# Patient Record
Sex: FEMALE | Race: WHITE | Employment: FULL TIME
[De-identification: names, ages, dates, MRNs, and addresses within clinical notes are randomized per-mention and may not be internally consistent; named-entity substitution may affect disease eponyms.]

---

## 2017-06-24 ENCOUNTER — HEALTH MAINTENANCE LETTER (OUTPATIENT)
Age: 62
End: 2017-06-24

## 2017-09-22 ENCOUNTER — OFFICE VISIT (OUTPATIENT)
Dept: GASTROENTEROLOGY | Facility: CLINIC | Age: 62
End: 2017-09-22
Attending: INTERNAL MEDICINE
Payer: COMMERCIAL

## 2017-09-22 ENCOUNTER — TRANSFERRED RECORDS (OUTPATIENT)
Dept: HEALTH INFORMATION MANAGEMENT | Facility: CLINIC | Age: 62
End: 2017-09-22

## 2017-09-22 VITALS
HEART RATE: 78 BPM | WEIGHT: 120.7 LBS | TEMPERATURE: 98.7 F | HEIGHT: 61 IN | DIASTOLIC BLOOD PRESSURE: 69 MMHG | SYSTOLIC BLOOD PRESSURE: 129 MMHG | BODY MASS INDEX: 22.79 KG/M2

## 2017-09-22 DIAGNOSIS — B18.2 CHRONIC HEPATITIS C WITHOUT HEPATIC COMA (H): Primary | ICD-10-CM

## 2017-09-22 PROCEDURE — 99213 OFFICE O/P EST LOW 20 MIN: CPT | Mod: ZF

## 2017-09-22 PROCEDURE — 91200 LIVER ELASTOGRAPHY: CPT | Mod: ZF

## 2017-09-22 ASSESSMENT — PAIN SCALES - GENERAL: PAINLEVEL: NO PAIN (0)

## 2017-09-22 NOTE — LETTER
9/22/2017       RE: Geni Bergman  [address]  [place] [place] [address]     Dear Colleague,    Thank you for referring your patient, Geni Bergman, to the [facility] HEPATOLOGY at [facility]. Please see a copy of my visit note below.    [facility] [place]    Hepatology follow-up    Follow-up visit for HCV    Subjective:  Ms. Bergman is a 62-year-old  female who was previously seen here and followed by my colleague, Lorenza Clay, for hepatitis C.  Her workup was significant in that she had genotype 1.  She also has stage II fibrosis on liver biopsy in 2012.  She had a low viral load and for that reason, she was approved and has done 8 weeks of ledipasvir/sofosbuvir (Harvoni).  She had a drop in her HCV RNA but did not achieve end of treatment response and for that reason, she is coming back today to be considered for salvage therapy.     In talking with her, she is telling me that she was compliant with her medication.  She never missed a single tablet and she is still feeling good about herself.  Does not have any signs of chronic liver disease, including edema, abdominal distention.  She does not have other GI symptoms.  Denies any nausea, vomiting or abdominal pain.  She is up-to-date with colonoscopy also.  Her weight has remained stable and she is not on any medication which would interact if we prescribed her any of the DAAs.  Patient denies jaundice, lethargy or confusion. Denies melena, hematemesis or hematochezia. No  fevers, sweats or chills.         Medical hx Surgical hx   History reviewed. No pertinent past medical history.   History reviewed. No pertinent surgical history.       Medications  Prior to Admission medications    Medication Sig Start Date End Date Taking? Authorizing Provider   TESTOSTERONE IN VANICREAM 1%, 10MG/GM,CREAM 0.25 mLs. Three times weekly   Yes Reported, Patient   VERSABASE CREA 80/20 apply 0.5mL daily   Yes  "Reported, Patient   PROGESTERONE 100mg place under tongue once daily   Yes Reported, Patient   Cholecalciferol (VITAMIN D3) LIQD 5,000 capsules One drop daily   Yes Reported, Patient   dhea 25 MG TABS Take 100 tablets by mouth daily    Yes Reported, Patient   Selenium 200 MCG TABS Take 1 tablet by mouth daily.   Yes Reported, Patient   Magnesium 300 MG CAPS Take 1 capsule by mouth daily.   Yes Reported, Patient       Allergies  No Known Allergies    Review of systems  A 10-point review of systems was negative    Examination  /69  Pulse 78  Temp 98.7  F (37.1  C) (Oral)  Ht 1.549 m (5' 1\")  Wt 54.7 kg (120 lb 11.2 oz)  BMI 22.81 kg/m2  Body mass index is 22.81 kg/(m^2).    Gen- well, NAD, A+Ox3, normal color  Lym- no palpable LAD  CVS- RRR  RS- CTA  Abd- Not distended, no hepatosplenomegaly.  Extr- hands normal, no FLIP  Skin- no rash or jaundice  Neuro- no asterixis  Psych- normal mood    Laboratory  Lab Results   Component Value Date     09/01/2016    POTASSIUM 4.4 09/01/2016    CHLORIDE 106 09/01/2016    CO2 27 09/01/2016    BUN 16 09/01/2016    CR 0.86 09/01/2016       Lab Results   Component Value Date    BILITOTAL 0.8 09/01/2016    ALT 45 09/01/2016    AST 42 09/01/2016    ALKPHOS 104 09/01/2016       Lab Results   Component Value Date    ALBUMIN 3.7 09/01/2016    PROTTOTAL 6.7 09/01/2016        Lab Results   Component Value Date    WBC 5.7 09/01/2016    HGB 14.6 09/01/2016    MCV 93 09/01/2016     09/01/2016       Lab Results   Component Value Date    INR 0.93 09/01/2016       Radiology    Assessment and Plan:   Ms. Bergman is a 62-year-old  female who was diagnosed with hepatitis C genotype 1.  She did do ledipasvir/sofosbuvir (Harvoni) for 8 weeks.  She did fail that and did not achieve end of treatment response.  Please note that the patient was not cirrhotic.  She had stage II fibrosis in 2012.  She has no signs of chronic liver disease.        PLAN:  Get the fibrosis scan " and she will have her labs repeated today.  We will consider her for salvage therapy.  She is very comfortable with that and she has no contraindications to this treatment.  Once the fibrosis scan is back, we will see if there was any progression of her liver disease, which was previously stage II.  She will also have an abdominal ultrasound.      This was a 25-minute visit of which more than 50% was spent in explaining to the patient what our plan of care was.  We answered all of her questions.      cc:  Primary care physician         Lorenza Clay M.D.     Gina Pacheco MD  Hepatology  [facility]

## 2017-09-22 NOTE — PROGRESS NOTES
[facility]    Hepatology follow-up    Follow-up visit for HCV    Subjective:  Ms. Bergman is a 62-year-old  female who was previously seen here and followed by my colleague, Lorenza Clay, for hepatitis C.  Her workup was significant in that she had genotype 1.  She also has stage II fibrosis on liver biopsy in 2012.  She had a low viral load and for that reason, she was approved and has done 8 weeks of ledipasvir/sofosbuvir (Harvoni).  She had a drop in her HCV RNA but did not achieve end of treatment response and for that reason, she is coming back today to be considered for salvage therapy.     In talking with her, she is telling me that she was compliant with her medication.  She never missed a single tablet and she is still feeling good about herself.  Does not have any signs of chronic liver disease, including edema, abdominal distention.  She does not have other GI symptoms.  Denies any nausea, vomiting or abdominal pain.  She is up-to-date with colonoscopy also.  Her weight has remained stable and she is not on any medication which would interact if we prescribed her any of the DAAs.  Patient denies jaundice, lethargy or confusion. Denies melena, hematemesis or hematochezia. No  fevers, sweats or chills.         Medical hx Surgical hx   History reviewed. No pertinent past medical history.   History reviewed. No pertinent surgical history.       Medications  Prior to Admission medications    Medication Sig Start Date End Date Taking? Authorizing Provider   TESTOSTERONE IN VANICREAM 1%, 10MG/GM,CREAM 0.25 mLs. Three times weekly   Yes Reported, Patient   VERSABASE CREA 80/20 apply 0.5mL daily   Yes Reported, Patient   PROGESTERONE 100mg place under tongue once daily   Yes Reported, Patient   Cholecalciferol (VITAMIN D3) LIQD 5,000 capsules One drop daily   Yes Reported, Patient   dhea 25 MG TABS Take 100 tablets by mouth daily    Yes Reported, Patient   Selenium 200 MCG  "TABS Take 1 tablet by mouth daily.   Yes Reported, Patient   Magnesium 300 MG CAPS Take 1 capsule by mouth daily.   Yes Reported, Patient       Allergies  No Known Allergies    Review of systems  A 10-point review of systems was negative    Examination  /69  Pulse 78  Temp 98.7  F (37.1  C) (Oral)  Ht 1.549 m (5' 1\")  Wt 54.7 kg (120 lb 11.2 oz)  BMI 22.81 kg/m2  Body mass index is 22.81 kg/(m^2).    Gen- well, NAD, A+Ox3, normal color  Lym- no palpable LAD  CVS- RRR  RS- CTA  Abd- Not distended, no hepatosplenomegaly.  Extr- hands normal, no FLIP  Skin- no rash or jaundice  Neuro- no asterixis  Psych- normal mood    Laboratory  Lab Results   Component Value Date     09/01/2016    POTASSIUM 4.4 09/01/2016    CHLORIDE 106 09/01/2016    CO2 27 09/01/2016    BUN 16 09/01/2016    CR 0.86 09/01/2016       Lab Results   Component Value Date    BILITOTAL 0.8 09/01/2016    ALT 45 09/01/2016    AST 42 09/01/2016    ALKPHOS 104 09/01/2016       Lab Results   Component Value Date    ALBUMIN 3.7 09/01/2016    PROTTOTAL 6.7 09/01/2016        Lab Results   Component Value Date    WBC 5.7 09/01/2016    HGB 14.6 09/01/2016    MCV 93 09/01/2016     09/01/2016       Lab Results   Component Value Date    INR 0.93 09/01/2016       Radiology    Assessment and Plan:   Ms. Bergman is a 62-year-old  female who was diagnosed with hepatitis C genotype 1.  She did do ledipasvir/sofosbuvir (Harvoni) for 8 weeks.  She did fail that and did not achieve end of treatment response.  Please note that the patient was not cirrhotic.  She had stage II fibrosis in 2012.  She has no signs of chronic liver disease.        PLAN:  Get the fibrosis scan and she will have her labs repeated today.  We will consider her for salvage therapy.  She is very comfortable with that and she has no contraindications to this treatment.  Once the fibrosis scan is back, we will see if there was any progression of her liver disease, which was " previously stage II.  She will also have an abdominal ultrasound.      This was a 25-minute visit of which more than 50% was spent in explaining to the patient what our plan of care was.  We answered all of her questions.      cc:  Primary care physician         Lorenza Clay M.D.     Gina Pacheco MD  Hepatology  [facility]

## 2017-09-22 NOTE — NURSING NOTE
"Chief Complaint   Patient presents with     RECHECK     follow up with Hep C, tzimmer cma       Initial /69  Pulse 78  Temp 98.7  F (37.1  C) (Oral)  Ht 1.549 m (5' 1\")  Wt 54.7 kg (120 lb 11.2 oz)  BMI 22.81 kg/m2 Estimated body mass index is 22.81 kg/(m^2) as calculated from the following:    Height as of this encounter: 1.549 m (5' 1\").    Weight as of this encounter: 54.7 kg (120 lb 11.2 oz).  Medication Reconciliation: complete    "

## 2017-09-22 NOTE — MR AVS SNAPSHOT
After Visit Summary   9/22/2017    Geni Bergman    MRN: [id number]           Patient Information     Date Of Birth          1955        Visit Information        Provider Department      9/22/2017 9:30 AM Gina Pacheco MD [facility] Hepatology        Today's Diagnoses     Chronic hepatitis C without hepatic coma (H)    -  1       Follow-ups after your visit        Follow-up notes from your care team     Return in about 4 months (around 1/22/2018).      Your next 10 appointments already scheduled     Sep 26, 2017  9:00 AM CDT   US ABDOMEN COMPLETE with US2   [facility] US ([facility])    [address] [place] [id number] [phone]           Please bring a list of your medicines (including vitamins, minerals and over-the-counter drugs). Also, tell your doctor about any allergies you may have. Wear comfortable clothes and leave your valuables at home.  Adults: No eating or drinking for 8 hours before the exam. You may take medicine with a small sip of water.  Children: - Children 6+ years: No food or drink for 6 hours before exam. - Children 1-5 years: No food or drink for 4 hours before exam. - Infants, breast-fed: may have breast milk up to 2 hours before exam. - Infants, formula: may have bottle until 4 hours before exam.  Please call the Imaging Department at your exam site with any questions.            Oct 13, 2017  8:00 AM CDT   Lab with  LAB   [facility] Lab ([facility])    [address] [place] [id number] [phone]            Oct 13, 2017  8:50 AM CDT   (Arrive by 8:35 AM)   Return General Liver with Lorenza Clay MD   [facility] Hepatology ([facility])    [address] [place] [id number] [phone]              Who to contact     If you have questions or need follow up information  "about today's clinic visit or your schedule please contact [facility] HEPATOLOGY directly at [phone].  Normal or non-critical lab and imaging results will be communicated to you by [employer], letter or phone within 4 business days after the clinic has received the results. If you do not hear from us within 7 days, please contact the clinic through [employer] or phone. If you have a critical or abnormal lab result, we will notify you by phone as soon as possible.  Submit refill requests through [employer] or call your pharmacy and they will forward the refill request to us. Please allow 3 business days for your refill to be completed.          Additional Information About Your Visit        [employer] Information     [employer] gives you secure access to your electronic health record. If you see a primary care provider, you can also send messages to your care team and make appointments. If you have questions, please call your primary care clinic.  If you do not have a primary care provider, please call [phone] and they will assist you.        Care EveryWhere ID     This is your Care EveryWhere ID. This could be used by other organizations to access your [place] medical records  [id number]        Your Vitals Were     Pulse Temperature Height BMI (Body Mass Index)          78 98.7  F (37.1  C) (Oral) 1.549 m (5' 1\") 22.81 kg/m2         Blood Pressure from Last 3 Encounters:   09/22/17 129/69   09/02/16 105/56   09/11/15 101/69    Weight from Last 3 Encounters:   09/22/17 54.7 kg (120 lb 11.2 oz)   09/02/16 52.6 kg (116 lb)   09/11/15 53.3 kg (117 lb 8 oz)              Today, you had the following     No orders found for display         Today's Medication Changes          These changes are accurate as of: 9/22/17 11:59 PM.  If you have any questions, ask your nurse or doctor.               Stop taking these medicines if you haven't already. Please contact your care team if you have questions.     Carnitine-B5-B6 500-15-5 MG " Tabs   Stopped by:  Gina Pacheco MD           FP ELIXIR OR   Stopped by:  Gina Pacheco MD           ledipasvir-sofosbuvir  MG per tablet   Commonly known as:  HARVONI   Stopped by:  Gina Pacheco MD                    Primary Care Provider Office Phone # Fax #    Yao Lema [phone]       INNOVATIVE DIRECTIONS IN HEALTH [address] [facility] [address] [facility] [address]    [facility] [address]        Equal Access to Services     [facility]: Hadii aad ku hadasho Soomaali, waaxda luqadaha, qaybta kaalmada adeegyada, waxay idiin hayaan adenoman villarreal . So [facility] [phone].    ATENCIÓN: Si habla español, tiene a blackwood disposición servicios gratuitos de asistencia lingüística. [facility] [phone].    We comply with applicable federal civil rights laws and [place] laws. We do not discriminate on the basis of race, color, national origin, age, disability sex, sexual orientation or gender identity.            Thank you!     Thank you for choosing [facility] HEPATOLOGY  for your care. Our goal is always to provide you with excellent care. Hearing back from our patients is one way we can continue to improve our services. Please take a few minutes to complete the written survey that you may receive in the mail after your visit with us. Thank you!             Your Updated Medication List - Protect others around you: Learn how to safely use, store and throw away your medicines at www.disposemymeds.org.          This list is accurate as of: 9/22/17 11:59 PM.  Always use your most recent med list.                   Brand Name Dispense Instructions for use Diagnosis    dhea 25 MG Tabs      Take 100 tablets by mouth daily        Magnesium 300 MG Caps      Take 1 capsule by mouth daily.        PROGESTERONE      100mg place under tongue once daily        Selenium 200 MCG Tabs      Take 1 tablet by mouth daily.        TESTOSTERONE IN VANICREAM 1% (10MG /GM) TD CREAM      0.25  mLs. Three times weekly        VERSABASE Crea      80/20 apply 0.5mL daily        Vitamin D3 Liqd      5,000 capsules One drop daily

## 2017-09-25 DIAGNOSIS — B18.2 CHRONIC HEPATITIS C WITHOUT HEPATIC COMA (H): Primary | ICD-10-CM

## 2017-09-29 ENCOUNTER — TELEPHONE (OUTPATIENT)
Dept: GASTROENTEROLOGY | Facility: CLINIC | Age: 62
End: 2017-09-29

## 2017-09-29 DIAGNOSIS — B18.2 CHRONIC HEPATITIS C WITHOUT HEPATIC COMA (H): Primary | ICD-10-CM

## 2017-09-29 NOTE — TELEPHONE ENCOUNTER
Writer spoke with patient regarding Dr. Pacheco's recommendations for MRI abd early next week. Liver mass seen on recent US as well as indications for cirrhosis on recent US despite low fibrosis scoring on fibrosis scan.    Pt aware of the plan and will call to schedule herself. Number given for the imaging department. Will await results.    Geni Bergman - 09/26/17 << Less Detail     Gina Pacheco MD     Sent: Thu September 28, 2017  9:40 PM     To: Andrew Patiño LPN          Result Note      Your ultrasound shows mild coarse heterogeneous liver parenchyma with slight nodular contours and irregular surface concerning for hepatic intrinsic disease such as advanced fibrosis.. There is a complex cystic lesion with a borderline thickened septation in the     left lobe without vascularization. Differentials include biliary cystadenoma, therefore close attention on follow-up studies is recommended.  Additionally, given history of hepatitic C, recommend further evaluation with dedicated liver MRI. It also shows gall stones.

## 2017-10-12 ENCOUNTER — RADIANT APPOINTMENT (OUTPATIENT)
Dept: MRI IMAGING | Facility: CLINIC | Age: 62
End: 2017-10-12
Attending: INTERNAL MEDICINE
Payer: COMMERCIAL

## 2017-10-12 DIAGNOSIS — B18.2 CHRONIC HEPATITIS C WITHOUT HEPATIC COMA (H): ICD-10-CM

## 2017-10-12 PROCEDURE — A9585 GADOBUTROL INJECTION: HCPCS | Mod: JW | Performed by: INTERNAL MEDICINE

## 2017-10-12 PROCEDURE — 74183 MRI ABD W/O CNTR FLWD CNTR: CPT | Performed by: RADIOLOGY

## 2017-10-12 RX ORDER — GADOBUTROL 604.72 MG/ML
7.5 INJECTION INTRAVENOUS ONCE
Status: DISCONTINUED | OUTPATIENT
Start: 2017-10-12 | End: 2017-10-12

## 2017-10-12 RX ORDER — GADOBUTROL 604.72 MG/ML
7.5 INJECTION INTRAVENOUS ONCE
Status: COMPLETED | OUTPATIENT
Start: 2017-10-12 | End: 2017-10-12

## 2017-10-12 RX ADMIN — GADOBUTROL 5.5 ML: 604.72 INJECTION INTRAVENOUS at 08:48

## 2017-10-13 DIAGNOSIS — B18.2 CHRONIC HEPATITIS C WITHOUT HEPATIC COMA (H): Primary | ICD-10-CM

## 2017-10-17 ENCOUNTER — TRANSFERRED RECORDS (OUTPATIENT)
Dept: HEALTH INFORMATION MANAGEMENT | Facility: CLINIC | Age: 62
End: 2017-10-17

## 2017-10-17 DIAGNOSIS — B18.2 CHRONIC HEPATITIS C WITHOUT HEPATIC COMA (H): Primary | ICD-10-CM

## 2017-12-13 ENCOUNTER — TRANSFERRED RECORDS (OUTPATIENT)
Dept: HEALTH INFORMATION MANAGEMENT | Facility: CLINIC | Age: 62
End: 2017-12-13

## 2018-01-23 ENCOUNTER — TELEPHONE (OUTPATIENT)
Dept: GASTROENTEROLOGY | Facility: CLINIC | Age: 63
End: 2018-01-23

## 2018-01-26 ENCOUNTER — OFFICE VISIT (OUTPATIENT)
Dept: GASTROENTEROLOGY | Facility: CLINIC | Age: 63
End: 2018-01-26
Attending: INTERNAL MEDICINE
Payer: COMMERCIAL

## 2018-01-26 VITALS
DIASTOLIC BLOOD PRESSURE: 77 MMHG | BODY MASS INDEX: 22.43 KG/M2 | SYSTOLIC BLOOD PRESSURE: 123 MMHG | WEIGHT: 118.8 LBS | TEMPERATURE: 98 F | HEART RATE: 83 BPM | HEIGHT: 61 IN | OXYGEN SATURATION: 95 %

## 2018-01-26 DIAGNOSIS — B18.2 CHRONIC HEPATITIS C WITHOUT HEPATIC COMA (H): ICD-10-CM

## 2018-01-26 DIAGNOSIS — B18.2 CHRONIC HEPATITIS C WITHOUT HEPATIC COMA (H): Primary | ICD-10-CM

## 2018-01-26 LAB
ALBUMIN SERPL-MCNC: 4.1 G/DL (ref 3.4–5)
ALP SERPL-CCNC: 121 U/L (ref 40–150)
ALT SERPL W P-5'-P-CCNC: 16 U/L (ref 0–50)
ANION GAP SERPL CALCULATED.3IONS-SCNC: 5 MMOL/L (ref 3–14)
AST SERPL W P-5'-P-CCNC: 17 U/L (ref 0–45)
BILIRUB DIRECT SERPL-MCNC: 0.1 MG/DL (ref 0–0.2)
BILIRUB SERPL-MCNC: 0.6 MG/DL (ref 0.2–1.3)
BUN SERPL-MCNC: 15 MG/DL (ref 7–30)
CALCIUM SERPL-MCNC: 8.9 MG/DL (ref 8.5–10.1)
CHLORIDE SERPL-SCNC: 106 MMOL/L (ref 94–109)
CO2 SERPL-SCNC: 27 MMOL/L (ref 20–32)
CREAT SERPL-MCNC: 0.84 MG/DL (ref 0.52–1.04)
ERYTHROCYTE [DISTWIDTH] IN BLOOD BY AUTOMATED COUNT: 12.1 % (ref 10–15)
GFR SERPL CREATININE-BSD FRML MDRD: 68 ML/MIN/1.7M2
GLUCOSE SERPL-MCNC: 95 MG/DL (ref 70–99)
HCT VFR BLD AUTO: 43.1 % (ref 35–47)
HGB BLD-MCNC: 15.4 G/DL (ref 11.7–15.7)
INR PPP: 0.97 (ref 0.86–1.14)
MCH RBC QN AUTO: 32.8 PG (ref 26.5–33)
MCHC RBC AUTO-ENTMCNC: 35.7 G/DL (ref 31.5–36.5)
MCV RBC AUTO: 92 FL (ref 78–100)
PLATELET # BLD AUTO: 113 10E9/L (ref 150–450)
POTASSIUM SERPL-SCNC: 4 MMOL/L (ref 3.4–5.3)
PROT SERPL-MCNC: 7.6 G/DL (ref 6.8–8.8)
RBC # BLD AUTO: 4.69 10E12/L (ref 3.8–5.2)
SODIUM SERPL-SCNC: 138 MMOL/L (ref 133–144)
WBC # BLD AUTO: 7.2 10E9/L (ref 4–11)

## 2018-01-26 PROCEDURE — 87522 HEPATITIS C REVRS TRNSCRPJ: CPT | Performed by: INTERNAL MEDICINE

## 2018-01-26 PROCEDURE — 80048 BASIC METABOLIC PNL TOTAL CA: CPT | Performed by: INTERNAL MEDICINE

## 2018-01-26 PROCEDURE — 85027 COMPLETE CBC AUTOMATED: CPT | Performed by: INTERNAL MEDICINE

## 2018-01-26 PROCEDURE — G0463 HOSPITAL OUTPT CLINIC VISIT: HCPCS | Mod: ZF

## 2018-01-26 PROCEDURE — 36415 COLL VENOUS BLD VENIPUNCTURE: CPT | Performed by: INTERNAL MEDICINE

## 2018-01-26 PROCEDURE — 80076 HEPATIC FUNCTION PANEL: CPT | Performed by: INTERNAL MEDICINE

## 2018-01-26 PROCEDURE — 85610 PROTHROMBIN TIME: CPT | Performed by: INTERNAL MEDICINE

## 2018-01-26 ASSESSMENT — PAIN SCALES - GENERAL: PAINLEVEL: NO PAIN (0)

## 2018-01-26 NOTE — MR AVS SNAPSHOT
After Visit Summary   1/26/2018    Geni Bergman    MRN: [id number]           Patient Information     Date Of Birth          1955        Visit Information        Provider Department      1/26/2018 9:00 AM Gina Pacheco MD [facility] Hepatology         Follow-ups after your visit        Follow-up notes from your care team     Return in about 3 months (around 4/26/2018).      Your next 10 appointments already scheduled     Jan 26, 2018  9:00 AM CST   (Arrive by 8:45 AM)   Return General Liver with Gina Pachceo MD   [facility] Hepatology ([facility])    [address]  [address]  [facility] [place] [facility] [address] [phone]            Apr 25, 2018  9:15 AM CDT   Lab with  LAB   [facility] Lab ([facility])    [address]  1[address] Floor  [facility] [place] [facility] [id number] [phone]            Apr 25, 2018 10:10 AM CDT   (Arrive by 9:55 AM)   Return General Liver with Gina Pacheco MD   [facility] Hepatology ([facility])    [address]  [address]  [facility] [place] [facility] [id number] [phone]              Who to contact     If you have questions or need follow up information about today's clinic visit or your schedule please contact [facility] HEPATOLOGY directly at [phone].  Normal or non-critical lab and imaging results will be communicated to you by MyChart, letter or phone within 4 business days after the clinic has received the results. If you do not hear from us within 7 days, please contact the clinic through MyChart or phone. If you have a critical or abnormal lab result, we will notify you by phone as soon as possible.  Submit refill requests through [employer] or call your pharmacy and they will forward the refill request to us. Please allow 3 business days for your refill to be completed.          Additional Information About Your Visit        MyChart Information      "[employer] gives you secure access to your electronic health record. If you see a primary care provider, you can also send messages to your care team and make appointments. If you have questions, please call your primary care clinic.  If you do not have a primary care provider, please call [phone] and they will assist you.        Care EveryWhere ID     This is your Care EveryWhere ID. This could be used by other organizations to access your [place] medical records  [id number]        Your Vitals Were     Pulse Temperature Height Pulse Oximetry BMI (Body Mass Index)       83 98  F (36.7  C) (Oral) 1.549 m (5' 1\") 95% 22.45 kg/m2        Blood Pressure from Last 3 Encounters:   01/26/18 123/77   09/22/17 129/69   09/02/16 105/56    Weight from Last 3 Encounters:   01/26/18 53.9 kg (118 lb 12.8 oz)   09/22/17 54.7 kg (120 lb 11.2 oz)   09/02/16 52.6 kg (116 lb)              Today, you had the following     No orders found for display       Primary Care Provider Office Phone # Fax #    Yao Lema [phone]       INNOVATIVE DIRECTIONS IN HEALTH [address] [address]        Equal Access to Services     ADRIANA ALCANTAR : Hadii aad ku hadasho Soomaali, waaxda luqadaha, qaybta kaalmada adeegyada, waxay idiin hayshaunn piero villarreal . So [facility] [phone].    ATENCIÓN: Si habla español, tiene a blackwood disposición servicios gratuitos de asistencia lingüística. Llame al [phone].    We comply with applicable federal civil rights laws and [place] laws. We do not discriminate on the basis of race, color, national origin, age, disability, sex, sexual orientation, or gender identity.            Thank you!     Thank you for choosing [facility] HEPATOLOGY  for your care. Our goal is always to provide you with excellent care. Hearing back from our patients is one way we can continue to improve our services. Please take a few minutes to complete the written survey that you may receive in " the mail after your visit with us. Thank you!             Your Updated Medication List - Protect others around you: Learn how to safely use, store and throw away your medicines at www.disposemymeds.org.          This list is accurate as of 1/26/18  8:57 AM.  Always use your most recent med list.                   Brand Name Dispense Instructions for use Diagnosis    dhea 25 MG Tabs      Take 100 mg by mouth daily        Magnesium 300 MG Caps      Take 1 capsule by mouth daily.        PROGESTERONE      100mg place under tongue once daily        Selenium 200 MCG Tabs      Take 1 tablet by mouth daily.        TESTOSTERONE IN VANICREAM 1% (10MG /GM) TD CREAM      0.25 mLs. Three times weekly        Versabase Crea      80/20 apply 0.5mL daily        Vitamin D3 Liqd      5,000 capsules One drop daily

## 2018-01-26 NOTE — NURSING NOTE
Chief Complaint   Patient presents with     RECHECK     Chronic Hepatitis C Treatment   Pt roomed, vitals, meds, and allergies reviewed with pt. Pt ready for provider.  Shaheed Cuellar, CMA

## 2018-01-26 NOTE — PROGRESS NOTES
"[facility]    Hepatology follow-up    CHIEF COMPLAINT:  Reason for the visit is hepatitis C virus infection.     Subjective:  Ms. Bergman is a 62-year-old  female whom we have seen here for hepatitis C virus infection.  She had a full workup.  Her genotype was 1.  She also had a stage 2 fibrosis on liver biopsy in 2012.  Her viral load was low.  Regardless, she did get approved before for 8 weeks of ledipasvir-sofosbuvir and had a drop in her HCV RNA but did not achieve end of treatment response.  Today, she came back here after she was treated with Mavyret.  In fact, we did put her on the salvage therapy protocol where we used Mavyret for 12 weeks.  She had end of treatment response.  Symptom wise, she tells me she had minimal fatigue.  No significant headaches, no pruritus.  She has no fluid retention and no jaundice, and she was having good bowel movements.     Medical hx Surgical hx   History reviewed. No pertinent past medical history.   History reviewed. No pertinent surgical history.       Medications  Prior to Admission medications    Medication Sig Start Date End Date Taking? Authorizing Provider   TESTOSTERONE IN VANICREAM 1%, 10MG/GM,CREAM 0.25 mLs. Three times weekly   Yes Reported, Patient   VERSABASE CREA 80/20 apply 0.5mL daily   Yes Reported, Patient   PROGESTERONE 100mg place under tongue once daily   Yes Reported, Patient   Cholecalciferol (VITAMIN D3) LIQD 5,000 capsules One drop daily   Yes Reported, Patient   dhea 25 MG TABS Take 100 mg by mouth daily    Yes Reported, Patient   Selenium 200 MCG TABS Take 1 tablet by mouth daily.   Yes Reported, Patient   Magnesium 300 MG CAPS Take 1 capsule by mouth daily.   Yes Reported, Patient       Allergies  No Known Allergies    Review of systems  A 10-point review of systems was negative    Examination  /77  Pulse 83  Temp 98  F (36.7  C) (Oral)  Ht 1.549 m (5' 1\")  Wt 53.9 kg (118 lb 12.8 oz)  SpO2 95%  " BMI 22.45 kg/m2  Body mass index is 22.45 kg/(m^2).    Gen- well, NAD, A+Ox3, normal color  Lym- no palpable LAD  CVS- RRR  RS- CTA  Abd- Not distened. No hepatosplenomegaly.  Extr- hands normal, no FLIP  Skin- no rash or jaundice  Neuro- no asterixis  Psych- normal mood    Laboratory  Lab Results   Component Value Date     01/26/2018    POTASSIUM 4.0 01/26/2018    CHLORIDE 106 01/26/2018    CO2 27 01/26/2018    BUN 15 01/26/2018    CR 0.84 01/26/2018       Lab Results   Component Value Date    BILITOTAL 0.6 01/26/2018    ALT 16 01/26/2018    AST 17 01/26/2018    ALKPHOS 121 01/26/2018       Lab Results   Component Value Date    ALBUMIN 4.1 01/26/2018    PROTTOTAL 7.6 01/26/2018        Lab Results   Component Value Date    WBC 7.2 01/26/2018    HGB 15.4 01/26/2018    MCV 92 01/26/2018     01/26/2018       Lab Results   Component Value Date    INR 0.97 01/26/2018       Radiology    ASSESSMENT AND PLAN:  Ms. Bergman is a 62-year-old  female with hepatitis C genotype 1.  She did do Mavyret 12 weeks and in fact achieved end of treatment response.  Please note that she had stage 2 fibrosis. She will be a coming back here 3 months down the road at which time we will test her again for HCV RNA, and if she is negative, then we will declare her to have a sustained virological response.  Please note that the patient has previously failed ledipasvir-sofosbuvir combination, and treatment was given to her as a salvage.  Her labs show normal liver function tests, which is also good, and her previous liver biopsy showed stage 2 fibrosis.  We did do a fibrosis scan here, and it appeared that she had stage F0.      This was a 25-minute visit of which more than 50% was spent in explaining to the patient what our plan of care was.  We answered all of her questions.       Gina Pacheco MD  Hepatology  [facility]

## 2018-01-26 NOTE — LETTER
1/26/2018       RE: Geni Bergman  [address]  [facility] [address]     Dear Colleague,    Thank you for referring your patient, Geni Bergman, to the [facility] HEPATOLOGY at [facility]. Please see a copy of my visit note below.    [facility]    Hepatology follow-up    CHIEF COMPLAINT:  Reason for the visit is hepatitis C virus infection.     Subjective:  Ms. Bergman is a 62-year-old  female whom we have seen here for hepatitis C virus infection.  She had a full workup.  Her genotype was 1.  She also had a stage 2 fibrosis on liver biopsy in 2012.  Her viral load was low.  Regardless, she did get approved before for 8 weeks of ledipasvir-sofosbuvir and had a drop in her HCV RNA but did not achieve end of treatment response.  Today, she came back here after she was treated with Mavyret.  In fact, we did put her on the salvage therapy protocol where we used Mavyret for 12 weeks.  She had end of treatment response.  Symptom wise, she tells me she had minimal fatigue.  No significant headaches, no pruritus.  She has no fluid retention and no jaundice, and she was having good bowel movements.     Medical hx Surgical hx   History reviewed. No pertinent past medical history.   History reviewed. No pertinent surgical history.       Medications  Prior to Admission medications    Medication Sig Start Date End Date Taking? Authorizing Provider   TESTOSTERONE IN VANICREAM 1%, 10MG/GM,CREAM 0.25 mLs. Three times weekly   Yes Reported, Patient   VERSABASE CREA 80/20 apply 0.5mL daily   Yes Reported, Patient   PROGESTERONE 100mg place under tongue once daily   Yes Reported, Patient   Cholecalciferol (VITAMIN D3) LIQD 5,000 capsules One drop daily   Yes Reported, Patient   dhea 25 MG TABS Take 100 mg by mouth daily    Yes Reported, Patient   Selenium 200 MCG TABS Take 1 tablet by mouth daily.   Yes Reported, Patient   Magnesium 300 MG CAPS Take  "1 capsule by mouth daily.   Yes Reported, Patient       Allergies  No Known Allergies    Review of systems  A 10-point review of systems was negative    Examination  /77  Pulse 83  Temp 98  F (36.7  C) (Oral)  Ht 1.549 m (5' 1\")  Wt 53.9 kg (118 lb 12.8 oz)  SpO2 95%  BMI 22.45 kg/m2  Body mass index is 22.45 kg/(m^2).    Gen- well, NAD, A+Ox3, normal color  Lym- no palpable LAD  CVS- RRR  RS- CTA  Abd- Not distened. No hepatosplenomegaly.  Extr- hands normal, no FLIP  Skin- no rash or jaundice  Neuro- no asterixis  Psych- normal mood    Laboratory  Lab Results   Component Value Date     01/26/2018    POTASSIUM 4.0 01/26/2018    CHLORIDE 106 01/26/2018    CO2 27 01/26/2018    BUN 15 01/26/2018    CR 0.84 01/26/2018       Lab Results   Component Value Date    BILITOTAL 0.6 01/26/2018    ALT 16 01/26/2018    AST 17 01/26/2018    ALKPHOS 121 01/26/2018       Lab Results   Component Value Date    ALBUMIN 4.1 01/26/2018    PROTTOTAL 7.6 01/26/2018        Lab Results   Component Value Date    WBC 7.2 01/26/2018    HGB 15.4 01/26/2018    MCV 92 01/26/2018     01/26/2018       Lab Results   Component Value Date    INR 0.97 01/26/2018       Radiology    ASSESSMENT AND PLAN:  Ms. Bergman is a 62-year-old  female with hepatitis C genotype 1.  She did do Mavyret 12 weeks and in fact achieved end of treatment response.  Please note that she had stage 2 fibrosis. She will be a coming back here 3 months down the road at which time we will test her again for HCV RNA, and if she is negative, then we will declare her to have a sustained virological response.  Please note that the patient has previously failed ledipasvir-sofosbuvir combination, and treatment was given to her as a salvage.  Her labs show normal liver function tests, which is also good, and her previous liver biopsy showed stage 2 fibrosis.  We did do a fibrosis scan here, and it appeared that she had stage F0.      This was a 25-minute " visit of which more than 50% was spent in explaining to the patient what our plan of care was.  We answered all of her questions.     Gina Pacheco MD  Hepatology  [facility]

## 2018-01-29 LAB
HCV RNA SERPL NAA+PROBE-ACNC: NORMAL [IU]/ML
HCV RNA SERPL NAA+PROBE-LOG IU: NORMAL LOG IU/ML

## 2018-03-22 DIAGNOSIS — B18.2 CHRONIC HEPATITIS C WITHOUT HEPATIC COMA (H): Primary | ICD-10-CM

## 2018-04-20 ENCOUNTER — OFFICE VISIT (OUTPATIENT)
Dept: GASTROENTEROLOGY | Facility: CLINIC | Age: 63
End: 2018-04-20
Attending: INTERNAL MEDICINE
Payer: COMMERCIAL

## 2018-04-20 ENCOUNTER — TELEPHONE (OUTPATIENT)
Dept: GASTROENTEROLOGY | Facility: CLINIC | Age: 63
End: 2018-04-20

## 2018-04-20 ENCOUNTER — HOSPITAL ENCOUNTER (OUTPATIENT)
Facility: AMBULATORY SURGERY CENTER | Age: 63
End: 2018-04-20
Attending: INTERNAL MEDICINE

## 2018-04-20 VITALS
TEMPERATURE: 98.2 F | HEART RATE: 82 BPM | OXYGEN SATURATION: 96 % | SYSTOLIC BLOOD PRESSURE: 132 MMHG | BODY MASS INDEX: 22.28 KG/M2 | HEIGHT: 61 IN | DIASTOLIC BLOOD PRESSURE: 75 MMHG | WEIGHT: 118 LBS

## 2018-04-20 DIAGNOSIS — B18.2 CHRONIC HEPATITIS C WITHOUT HEPATIC COMA (H): Primary | ICD-10-CM

## 2018-04-20 DIAGNOSIS — B18.2 CHRONIC HEPATITIS C WITHOUT HEPATIC COMA (H): ICD-10-CM

## 2018-04-20 LAB
ALBUMIN SERPL-MCNC: 3.8 G/DL (ref 3.4–5)
ALP SERPL-CCNC: 104 U/L (ref 40–150)
ALT SERPL W P-5'-P-CCNC: 15 U/L (ref 0–50)
ANION GAP SERPL CALCULATED.3IONS-SCNC: 6 MMOL/L (ref 3–14)
AST SERPL W P-5'-P-CCNC: 16 U/L (ref 0–45)
BASOPHILS # BLD AUTO: 0 10E9/L (ref 0–0.2)
BASOPHILS NFR BLD AUTO: 0.6 %
BILIRUB DIRECT SERPL-MCNC: 0.1 MG/DL (ref 0–0.2)
BILIRUB SERPL-MCNC: 0.7 MG/DL (ref 0.2–1.3)
BUN SERPL-MCNC: 16 MG/DL (ref 7–30)
CALCIUM SERPL-MCNC: 8.9 MG/DL (ref 8.5–10.1)
CHLORIDE SERPL-SCNC: 106 MMOL/L (ref 94–109)
CO2 SERPL-SCNC: 27 MMOL/L (ref 20–32)
CREAT SERPL-MCNC: 0.88 MG/DL (ref 0.52–1.04)
DIFFERENTIAL METHOD BLD: ABNORMAL
EOSINOPHIL # BLD AUTO: 0.1 10E9/L (ref 0–0.7)
EOSINOPHIL NFR BLD AUTO: 1.3 %
ERYTHROCYTE [DISTWIDTH] IN BLOOD BY AUTOMATED COUNT: 12.3 % (ref 10–15)
GFR SERPL CREATININE-BSD FRML MDRD: 65 ML/MIN/1.7M2
GLUCOSE SERPL-MCNC: 97 MG/DL (ref 70–99)
HCT VFR BLD AUTO: 41.9 % (ref 35–47)
HGB BLD-MCNC: 14.5 G/DL (ref 11.7–15.7)
IMM GRANULOCYTES # BLD: 0 10E9/L (ref 0–0.4)
IMM GRANULOCYTES NFR BLD: 0.2 %
INR PPP: 1.02 (ref 0.86–1.14)
LYMPHOCYTES # BLD AUTO: 2.3 10E9/L (ref 0.8–5.3)
LYMPHOCYTES NFR BLD AUTO: 36.1 %
MCH RBC QN AUTO: 32.3 PG (ref 26.5–33)
MCHC RBC AUTO-ENTMCNC: 34.6 G/DL (ref 31.5–36.5)
MCV RBC AUTO: 93 FL (ref 78–100)
MONOCYTES # BLD AUTO: 0.5 10E9/L (ref 0–1.3)
MONOCYTES NFR BLD AUTO: 8.6 %
NEUTROPHILS # BLD AUTO: 3.3 10E9/L (ref 1.6–8.3)
NEUTROPHILS NFR BLD AUTO: 53.2 %
NRBC # BLD AUTO: 0 10*3/UL
NRBC BLD AUTO-RTO: 0 /100
PLATELET # BLD AUTO: 110 10E9/L (ref 150–450)
POTASSIUM SERPL-SCNC: 4.1 MMOL/L (ref 3.4–5.3)
PROT SERPL-MCNC: 7 G/DL (ref 6.8–8.8)
RBC # BLD AUTO: 4.49 10E12/L (ref 3.8–5.2)
SODIUM SERPL-SCNC: 140 MMOL/L (ref 133–144)
WBC # BLD AUTO: 6.3 10E9/L (ref 4–11)

## 2018-04-20 PROCEDURE — 85610 PROTHROMBIN TIME: CPT | Performed by: INTERNAL MEDICINE

## 2018-04-20 PROCEDURE — G0463 HOSPITAL OUTPT CLINIC VISIT: HCPCS | Mod: ZF

## 2018-04-20 PROCEDURE — 36415 COLL VENOUS BLD VENIPUNCTURE: CPT | Performed by: INTERNAL MEDICINE

## 2018-04-20 PROCEDURE — 80076 HEPATIC FUNCTION PANEL: CPT | Performed by: INTERNAL MEDICINE

## 2018-04-20 PROCEDURE — 87522 HEPATITIS C REVRS TRNSCRPJ: CPT | Performed by: INTERNAL MEDICINE

## 2018-04-20 PROCEDURE — 80048 BASIC METABOLIC PNL TOTAL CA: CPT | Performed by: INTERNAL MEDICINE

## 2018-04-20 PROCEDURE — 85025 COMPLETE CBC W/AUTO DIFF WBC: CPT | Performed by: INTERNAL MEDICINE

## 2018-04-20 NOTE — LETTER
4/20/2018    RE: Geni Bergman  [address]  [facility] [address]     [facility]    Hepatology follow-up    CHIEF COMPLAINT AND REASON FOR THE VISIT:  Hepatitis C virus infection.      SUBJECTIVE:  Mrs. Bergman is a 62-year-old  female with hepatitis C virus infection.  She was seen by us after she failed ledipasvir/sofosbuvir, which she did only 8 weeks.  She did have stage 2 fibrosis on liver biopsy, but she had a fibrosis scan done which showed no significant fibrosis, although the patient's platelet count was depressed at 110,000.  Anyway, she has finished treatment for the hepatitis C with mavyret  and has achieved end of treatment response, and now she is having to be evaluated if she has a sustained virological response.  Mrs. Bergman denies any symptoms, denies any nausea, vomiting or abdominal pain.  She is having good bowel movements.  Weight is stable.  She does not have any jaundice or pruritus.  She is up-to-date with colonoscopy, although she has not done an EGD.  She also denies any signs of decompensation and this includes jaundice.  Patient denies melena, hematemesis or hematochezia. Denies fevers, sweats or chills.      Medical hx Surgical hx   Past Medical History:   Diagnosis Date     HCV (hepatitis C virus)       No past surgical history on file.       Medications  Prior to Admission medications    Medication Sig Start Date End Date Taking? Authorizing Provider   Cholecalciferol (VITAMIN D3) LIQD 5,000 capsules One drop daily   Yes Reported, Patient   dhea 25 MG TABS Take 100 mg by mouth daily    Yes Reported, Patient   Magnesium 300 MG CAPS Take 1 capsule by mouth daily.   Yes Reported, Patient   PROGESTERONE 100mg place under tongue once daily   Yes Reported, Patient   Selenium 200 MCG TABS Take 1 tablet by mouth daily.   Yes Reported, Patient   TESTOSTERONE IN VANICREAM 1%, 10MG/GM,CREAM 0.25 mLs. Three times weekly   Yes Reported, Patient  "  VERSABASE CREA 80/20 apply 0.5mL daily   Yes Reported, Patient       Allergies  No Known Allergies    Review of systems  A 10-point review of systems was negative    Examination  /75  Pulse 82  Temp 98.2  F (36.8  C)  Ht 1.549 m (5' 1\")  Wt 53.5 kg (118 lb)  SpO2 96%  BMI 22.3 kg/m2  Body mass index is 22.3 kg/(m^2).    Gen- well, NAD, A+Ox3, normal color  Lym- no palpable LAD  CVS- RRR  RS- CTA  Abd- Not distended. No hepatosplenomegaly.  Extr- hands normal, no FLIP  Skin- no rash or jaundice  Neuro- no asterixis  Psych- normal mood    Laboratory  Lab Results   Component Value Date     04/20/2018    POTASSIUM 4.1 04/20/2018    CHLORIDE 106 04/20/2018    CO2 27 04/20/2018    BUN 16 04/20/2018    CR 0.88 04/20/2018       Lab Results   Component Value Date    BILITOTAL 0.7 04/20/2018    ALT 15 04/20/2018    AST 16 04/20/2018    ALKPHOS 104 04/20/2018       Lab Results   Component Value Date    ALBUMIN 3.8 04/20/2018    PROTTOTAL 7.0 04/20/2018        Lab Results   Component Value Date    WBC 6.3 04/20/2018    HGB 14.5 04/20/2018    MCV 93 04/20/2018     04/20/2018       Lab Results   Component Value Date    INR 1.02 04/20/2018       Radiology    ASSESSMENT AND PLAN:  Mrs. Bergman has hepatitis C genotype 1.  She did fail treatment with 8 weeks of ledipasvir/sofosbuvir and now we did treat her with Mavyret for 12 weeks.  She did achieve end of treatment response, and today she is coming to be checked if she has achieved a sustained virological response.  Blood is drawn and an HCV RNA is pending.  Anyway, she has minimal fibrosis both on the liver biopsy, which was done in 2012, and also by the fibrosis scan done more recently.  In both cases, she has no fibrosis or early fibrosis.  Anyway, she will also follow with her primary care physician regarding her health care maintenance issues.  She is very positive about having also her labs done today, which were normal, and awaiting her HCV RNA " quant, and if that is negative, it means that she has achieved a cure.        This was a 25-minute visit of which more than 50% was spent in explaining to the patient what our plan of care was.  We answered all of her questions.       Gina Pacheco MD  Hepatology  [facility]

## 2018-04-20 NOTE — PATIENT INSTRUCTIONS
UPPER GI ENDOSCOPY is scheduled for at , check-in at. They are located on the 1st floor [facility], room 1-301.  Their phone number is [phone]

## 2018-04-20 NOTE — MR AVS SNAPSHOT
After Visit Summary   4/20/2018    Geni Bergman    MRN: [id number]           Patient Information     Date Of Birth          1955        Visit Information        Provider Department      4/20/2018 8:30 AM Gina Pacheco MD [facility] Hepatology        Today's Diagnoses     Chronic hepatitis C without hepatic coma (H)    -  1      Care Instructions    UPPER GI ENDOSCOPY is scheduled for at , check-in at. They are located on the 1st floor [facility], room 1-301.  Their phone number is [phone]            Follow-ups after your visit        Your next 10 appointments already scheduled     May 10, 2018   Procedure with Gina Pacheco MD   [facility] ([facility])    [address] [place] [address]  5th Floor  [facility] [address] [phone]           Located in the Clinics and Surgery Center at [address] [place] [address].   parking is very convenient and highly recommended.  is a $6 flat rate fee.  Both  and self parkers should enter the main arrival plaza from [facility]; parking attendants will direct you based on your parking preference.            Nov 06, 2018  8:30 AM CST   Lab with  LAB   [facility] ([facility])    [address] [place] [address]  1st [facility] [address] [phone]            Nov 06, 2018  9:30 AM CST   (Arrive by 9:15 AM)   Return General Liver with Gina Pacheco MD   [facility] Hepatology ([facility])    [address] [place] [address]  [address]  [facility] [address] [phone]              Future tests that were ordered for you today     Open Future Orders        Priority Expected Expires Ordered    UPPER GI ENDOSCOPY Routine  6/4/2018 4/20/2018            Who to contact     If you have questions or need follow up information about today's clinic visit or your schedule please contact [facility]  "HEPATOLOGY directly at [phone].  Normal or non-critical lab and imaging results will be communicated to you by MyChart, letter or phone within 4 business days after the clinic has received the results. If you do not hear from us within 7 days, please contact the clinic through [employer] or phone. If you have a critical or abnormal lab result, we will notify you by phone as soon as possible.  Submit refill requests through [employer] or call your pharmacy and they will forward the refill request to us. Please allow 3 business days for your refill to be completed.          Additional Information About Your Visit        [employer] Information     [employer] gives you secure access to your electronic health record. If you see a primary care provider, you can also send messages to your care team and make appointments. If you have questions, please call your primary care clinic.  If you do not have a primary care provider, please call [phone] and they will assist you.        Care EveryWhere ID     This is your Care EveryWhere ID. This could be used by other organizations to access your [facility] medical records  [id number]        Your Vitals Were     Pulse Temperature Height Pulse Oximetry BMI (Body Mass Index)       82 98.2  F (36.8  C) 1.549 m (5' 1\") 96% 22.3 kg/m2        Blood Pressure from Last 3 Encounters:   04/20/18 132/75   01/26/18 123/77   09/22/17 129/69    Weight from Last 3 Encounters:   04/20/18 53.5 kg (118 lb)   01/26/18 53.9 kg (118 lb 12.8 oz)   09/22/17 54.7 kg (120 lb 11.2 oz)               Primary Care Provider Office Phone # Fax #    Yao J Pita [phone]       [facility] [address]     [place] [address]        Equal Access to Services     FLAKITA ALCANTAR AH: Hadii clifford pedro hadasho Solucasali, waaxda luqadaha, qaybta kaalmada adeegyada, tegan antoine. So [facility] [phone].    ATENCIÓN: Si habla español, tiene a blackwood disposición " servicios gratuitos de asistencia lingüística. José Miguel escobedo [phone].    We comply with applicable federal civil rights laws and [place] laws. We do not discriminate on the basis of race, color, national origin, age, disability, sex, sexual orientation, or gender identity.            Thank you!     Thank you for choosing [facility] HEPATOLOGY  for your care. Our goal is always to provide you with excellent care. Hearing back from our patients is one way we can continue to improve our services. Please take a few minutes to complete the written survey that you may receive in the mail after your visit with us. Thank you!             Your Updated Medication List - Protect others around you: Learn how to safely use, store and throw away your medicines at www.disposemymeds.org.          This list is accurate as of 4/20/18 11:59 PM.  Always use your most recent med list.                   Brand Name Dispense Instructions for use Diagnosis    dhea 25 MG Tabs      Take 100 mg by mouth daily        Magnesium 300 MG Caps      Take 1 capsule by mouth daily.        PROGESTERONE      100mg place under tongue once daily        Selenium 200 MCG Tabs      Take 1 tablet by mouth daily.        TESTOSTERONE IN VANICREAM 1% (10MG /GM) TD CREAM      0.25 mLs. Three times weekly        Versabase Crea      80/20 apply 0.5mL daily        Vitamin D3 Liqd      5,000 capsules One drop daily

## 2018-04-20 NOTE — PROGRESS NOTES
[facility]    Hepatology follow-up    CHIEF COMPLAINT AND REASON FOR THE VISIT:  Hepatitis C virus infection.      SUBJECTIVE:  Mrs. Bergman is a 62-year-old  female with hepatitis C virus infection.  She was seen by us after she failed ledipasvir/sofosbuvir, which she did only 8 weeks.  She did have stage 2 fibrosis on liver biopsy, but she had a fibrosis scan done which showed no significant fibrosis, although the patient's platelet count was depressed at 110,000.  Anyway, she has finished treatment for the hepatitis C with mavyret  and has achieved end of treatment response, and now she is having to be evaluated if she has a sustained virological response.  Mrs. Bergman denies any symptoms, denies any nausea, vomiting or abdominal pain.  She is having good bowel movements.  Weight is stable.  She does not have any jaundice or pruritus.  She is up-to-date with colonoscopy, although she has not done an EGD.  She also denies any signs of decompensation and this includes jaundice.  Patient denies melena, hematemesis or hematochezia. Denies fevers, sweats or chills.      Medical hx Surgical hx   Past Medical History:   Diagnosis Date     HCV (hepatitis C virus)       No past surgical history on file.       Medications  Prior to Admission medications    Medication Sig Start Date End Date Taking? Authorizing Provider   Cholecalciferol (VITAMIN D3) LIQD 5,000 capsules One drop daily   Yes Reported, Patient   dhea 25 MG TABS Take 100 mg by mouth daily    Yes Reported, Patient   Magnesium 300 MG CAPS Take 1 capsule by mouth daily.   Yes Reported, Patient   PROGESTERONE 100mg place under tongue once daily   Yes Reported, Patient   Selenium 200 MCG TABS Take 1 tablet by mouth daily.   Yes Reported, Patient   TESTOSTERONE IN VANICREAM 1%, 10MG/GM,CREAM 0.25 mLs. Three times weekly   Yes Reported, Patient   VERSABASE CREA 80/20 apply 0.5mL daily   Yes Reported, Patient       Allergies  No  "Known Allergies    Review of systems  A 10-point review of systems was negative    Examination  /75  Pulse 82  Temp 98.2  F (36.8  C)  Ht 1.549 m (5' 1\")  Wt 53.5 kg (118 lb)  SpO2 96%  BMI 22.3 kg/m2  Body mass index is 22.3 kg/(m^2).    Gen- well, NAD, A+Ox3, normal color  Lym- no palpable LAD  CVS- RRR  RS- CTA  Abd- Not distended. No hepatosplenomegaly.  Extr- hands normal, no FLIP  Skin- no rash or jaundice  Neuro- no asterixis  Psych- normal mood    Laboratory  Lab Results   Component Value Date     04/20/2018    POTASSIUM 4.1 04/20/2018    CHLORIDE 106 04/20/2018    CO2 27 04/20/2018    BUN 16 04/20/2018    CR 0.88 04/20/2018       Lab Results   Component Value Date    BILITOTAL 0.7 04/20/2018    ALT 15 04/20/2018    AST 16 04/20/2018    ALKPHOS 104 04/20/2018       Lab Results   Component Value Date    ALBUMIN 3.8 04/20/2018    PROTTOTAL 7.0 04/20/2018        Lab Results   Component Value Date    WBC 6.3 04/20/2018    HGB 14.5 04/20/2018    MCV 93 04/20/2018     04/20/2018       Lab Results   Component Value Date    INR 1.02 04/20/2018       Radiology    ASSESSMENT AND PLAN:  Mrs. Bergman has hepatitis C genotype 1.  She did fail treatment with 8 weeks of ledipasvir/sofosbuvir and now we did treat her with Mavyret for 12 weeks.  She did achieve end of treatment response, and today she is coming to be checked if she has achieved a sustained virological response.  Blood is drawn and an HCV RNA is pending.  Anyway, she has minimal fibrosis both on the liver biopsy, which was done in 2012, and also by the fibrosis scan done more recently.  In both cases, she has no fibrosis or early fibrosis.  Anyway, she will also follow with her primary care physician regarding her health care maintenance issues.  She is very positive about having also her labs done today, which were normal, and awaiting her HCV RNA quant, and if that is negative, it means that she has achieved a cure.        This was a " 25-minute visit of which more than 50% was spent in explaining to the patient what our plan of care was.  We answered all of her questions.         Gina Pacheco MD  Hepatology  [facility]

## 2018-04-20 NOTE — NURSING NOTE
"Reason For Visit:   Chief Complaint   Patient presents with     RECHECK     f/u general liver/ Hep C       Primary MD: Yao Lema    /75  Pulse 82  Temp 98.2  F (36.8  C)  Ht 1.549 m (5' 1\")  Wt 53.5 kg (118 lb)  SpO2 96%  BMI 22.3 kg/m2       [id number] [place], [place] - [address] [facility] [address]  [facility] - [place], [place] - [address] [place] [address] [facility] - [place], [place] - [address] [facility]    No Known Allergies    Current Outpatient Prescriptions   Medication     Cholecalciferol (VITAMIN D3) LIQD     dhea 25 MG TABS     Magnesium 300 MG CAPS     PROGESTERONE     Selenium 200 MCG TABS     TESTOSTERONE IN VANICREAM 1%, 10MG/GM,CREAM     VERSABASE CREA     No current facility-administered medications for this visit.                 Chepe CHACON, CMA    "

## 2018-04-23 LAB
HCV RNA SERPL NAA+PROBE-ACNC: NORMAL [IU]/ML
HCV RNA SERPL NAA+PROBE-LOG IU: NORMAL LOG IU/ML

## 2018-05-18 ENCOUNTER — TELEPHONE (OUTPATIENT)
Dept: GASTROENTEROLOGY | Facility: CLINIC | Age: 63
End: 2018-05-18

## 2018-10-12 ENCOUNTER — TELEPHONE (OUTPATIENT)
Dept: GASTROENTEROLOGY | Facility: CLINIC | Age: 63
End: 2018-10-12

## 2018-10-12 NOTE — TELEPHONE ENCOUNTER
Called patient to let her know that her appt with Dr. Pacheco 11/6 is not needed.  Patient is cured of Hepatitis C and had only stage 2 fibrosis, so no longer needed to follow in Hepatology.  Patient was aware and agreed to canceling appointment.

## 2019-10-02 ENCOUNTER — HEALTH MAINTENANCE LETTER (OUTPATIENT)
Age: 64
End: 2019-10-02

## 2019-12-16 ENCOUNTER — HEALTH MAINTENANCE LETTER (OUTPATIENT)
Age: 64
End: 2019-12-16

## 2021-01-15 ENCOUNTER — HEALTH MAINTENANCE LETTER (OUTPATIENT)
Age: 66
End: 2021-01-15

## 2021-09-04 ENCOUNTER — HEALTH MAINTENANCE LETTER (OUTPATIENT)
Age: 66
End: 2021-09-04

## 2021-10-30 ENCOUNTER — HEALTH MAINTENANCE LETTER (OUTPATIENT)
Age: 66
End: 2021-10-30

## 2022-02-19 ENCOUNTER — HEALTH MAINTENANCE LETTER (OUTPATIENT)
Age: 67
End: 2022-02-19

## 2022-10-22 ENCOUNTER — HEALTH MAINTENANCE LETTER (OUTPATIENT)
Age: 67
End: 2022-10-22

## 2023-04-01 ENCOUNTER — HEALTH MAINTENANCE LETTER (OUTPATIENT)
Age: 68
End: 2023-04-01

## 2023-11-05 ENCOUNTER — HEALTH MAINTENANCE LETTER (OUTPATIENT)
Age: 68
End: 2023-11-05